# Patient Record
Sex: MALE | Race: WHITE | NOT HISPANIC OR LATINO | URBAN - METROPOLITAN AREA
[De-identification: names, ages, dates, MRNs, and addresses within clinical notes are randomized per-mention and may not be internally consistent; named-entity substitution may affect disease eponyms.]

---

## 2019-01-31 ENCOUNTER — NEW PATIENT (OUTPATIENT)
Dept: URBAN - METROPOLITAN AREA CLINIC 56 | Facility: CLINIC | Age: 79
End: 2019-01-31
Payer: MEDICARE

## 2019-01-31 DIAGNOSIS — Z79.84 LONG TERM (CURRENT) USE OF ORAL ANTIDIABETIC DRUGS: ICD-10-CM

## 2019-01-31 DIAGNOSIS — E11.9 TYPE 2 DIABETES MELLITUS WITHOUT COMPLICATIONS: Primary | ICD-10-CM

## 2019-01-31 DIAGNOSIS — H43.823 VITREOMACULAR ADHESION, BILATERAL: ICD-10-CM

## 2019-01-31 PROCEDURE — 92004 COMPRE OPH EXAM NEW PT 1/>: CPT | Performed by: OPTOMETRIST

## 2019-01-31 PROCEDURE — 92134 CPTRZ OPH DX IMG PST SGM RTA: CPT | Performed by: OPTOMETRIST

## 2019-01-31 ASSESSMENT — VISUAL ACUITY
OD: 20/25
OS: 20/25

## 2019-01-31 ASSESSMENT — KERATOMETRY
OD: 39.49
OS: 39.36

## 2019-01-31 ASSESSMENT — INTRAOCULAR PRESSURE
OD: 18
OS: 18

## 2019-02-19 ENCOUNTER — RX CHECK (OUTPATIENT)
Dept: URBAN - METROPOLITAN AREA CLINIC 56 | Facility: CLINIC | Age: 79
End: 2019-02-19
Payer: MEDICARE

## 2019-02-19 DIAGNOSIS — H53.2 DIPLOPIA: Primary | ICD-10-CM

## 2019-02-19 PROCEDURE — 92015 DETERMINE REFRACTIVE STATE: CPT | Performed by: OPTOMETRIST

## 2019-02-19 ASSESSMENT — KERATOMETRY
OD: 39.61
OS: 39.60

## 2019-02-19 ASSESSMENT — VISUAL ACUITY
OS: 20/40
OD: 20/30

## 2021-08-18 ENCOUNTER — OFFICE VISIT (OUTPATIENT)
Dept: URBAN - METROPOLITAN AREA CLINIC 56 | Facility: CLINIC | Age: 81
End: 2021-08-18
Payer: MEDICARE

## 2021-08-18 DIAGNOSIS — Z98.890 OTHER SPECIFIED POSTPROCEDURAL STATES: ICD-10-CM

## 2021-08-18 DIAGNOSIS — H25.813 COMBINED FORMS OF AGE-RELATED CATARACT, BILATERAL: ICD-10-CM

## 2021-08-18 DIAGNOSIS — H52.223 REGULAR ASTIGMATISM, BILATERAL: ICD-10-CM

## 2021-08-18 PROCEDURE — 92134 CPTRZ OPH DX IMG PST SGM RTA: CPT | Performed by: OPTOMETRIST

## 2021-08-18 PROCEDURE — 99214 OFFICE O/P EST MOD 30 MIN: CPT | Performed by: OPTOMETRIST

## 2021-08-18 PROCEDURE — 92250 FUNDUS PHOTOGRAPHY W/I&R: CPT | Performed by: OPTOMETRIST

## 2021-08-18 ASSESSMENT — KERATOMETRY
OD: 39.70
OS: 39.60

## 2021-08-18 ASSESSMENT — VISUAL ACUITY
OS: 20/60
OD: 20/40

## 2021-08-18 ASSESSMENT — INTRAOCULAR PRESSURE
OS: 19
OD: 19

## 2021-08-18 NOTE — IMPRESSION/PLAN
Impression: Regular astigmatism, bilateral: H52.223. Plan: Fluctuating astigmatic correction form past RK accounts for the patient's intermittent diplopia and blurred vision.

## 2021-08-18 NOTE — IMPRESSION/PLAN
Impression: Combined forms of age-related cataract, bilateral Plan: Discussed cataract surgery option with patient today; however, declined a surgical consultation at this time. We will continue to monitor. Patient to return in 1 year for CEE with refraction and  glare testing if indicated or sooner if patient experiences a significant decline in acuity.

## 2021-08-18 NOTE — IMPRESSION/PLAN
Impression: Diplopia ; OU Plan: Longstandng commitant mild esophoria. Trial of mild base out prism provided little subjective improvement. Continue to monitor with out prism correction.

## 2021-08-18 NOTE — IMPRESSION/PLAN
Impression: Other specified postprocedural states: Z98.890. Plan: RK incisions OU. The multiple corneal incisions account for the fluctuation in the patient's refractive error and vision. It is also more likely than not a contributing etiology for his intermittent diplopia.